# Patient Record
Sex: FEMALE | Race: WHITE
[De-identification: names, ages, dates, MRNs, and addresses within clinical notes are randomized per-mention and may not be internally consistent; named-entity substitution may affect disease eponyms.]

---

## 2017-05-16 ENCOUNTER — HOSPITAL ENCOUNTER (OUTPATIENT)
Dept: HOSPITAL 61 - CT | Age: 52
Discharge: HOME | End: 2017-05-16
Attending: FAMILY MEDICINE
Payer: COMMERCIAL

## 2017-05-16 DIAGNOSIS — Z12.31: Primary | ICD-10-CM

## 2017-05-16 DIAGNOSIS — E03.9: ICD-10-CM

## 2017-05-16 DIAGNOSIS — E78.00: ICD-10-CM

## 2017-05-16 DIAGNOSIS — R07.1: ICD-10-CM

## 2017-05-16 LAB
CHOLEST SERPL-MCNC: 211 MG/DL (ref 0–200)
CHOLEST/HDLC SERPL: 2.3 {RATIO}
HDLC SERPL-MCNC: 90 MG/DL (ref 40–60)
NONHDLC SERPL-MCNC: 121 MG/DL (ref 0–129)
TRIGL SERPL-MCNC: 82 MG/DL (ref 0–150)

## 2017-05-16 PROCEDURE — 80061 LIPID PANEL: CPT

## 2017-05-16 PROCEDURE — 71250 CT THORAX DX C-: CPT

## 2017-05-16 PROCEDURE — 84443 ASSAY THYROID STIM HORMONE: CPT

## 2017-05-16 PROCEDURE — 36415 COLL VENOUS BLD VENIPUNCTURE: CPT

## 2017-05-16 NOTE — RAD
Examination: CT chest with contrast



History: History of chest pain on breathing



Comparison: None available



Technique: Axial CT images of the abdomen pelvis were performed without

contrast. Coronal and sagittal reformats were performed





PQRS Compliance Statement:



One or more of the following individualized dose reduction techniques were

utilized for this examination:

1. Automated exposure control

2. Adjustment of the mA and/or kV according to patient size

3. Use of iterative reconstruction technique 



Findings:



The central airways are patent. The heart size grossly appears unremarkable.

The caliber of the aorta grossly appears unremarkable.



The examination is limited due to lack of IV contrast. No evidence of

pericardial effusion identified.



No radiologically significant mediastinal lymphadenopathy identified.



4 mm nodule or lymph node identified in the right middle lobe of the lung

along the fissure.



No evidence of pleural effusion or pneumothorax.



The visualized noncontrasted liver, spleen, adrenals grossly appears

unremarkable.



No evidence of lytic bony destructive lesion identified.





Impression:



1. 4 mm lymph node or solid nodule identified in the right middle lobe of the

lung along the fissure. Otherwise unremarkable exam. Follow-up per [employer]

Society guidelines.



Low risk patient: No follow-up needed. 

 

High-risk patient: Optional CT at 12 months.

## 2017-05-16 NOTE — RAD
DATE: 5/16/2017



EXAM: DIGITAL SCREEN BILAT W/CAD



HISTORY: Routine screening



COMPARISON: 5/30/2013



This study was interpreted with the benefit of Computerized Aided Detection

(CAD).







FINDINGS:



Breast Density:  HETERO  The breast parenchyma Is heterogeneous dense, which

could reduce sensitivity of mammography. Breast parenchyma level C.  There 2

small foci of asymmetry identified in the left outer breast, best seen on the

cc view.



      







IMPRESSION: 2 small foci of asymmetries identified in the left outer breast

best seen on the CC view. Recommend spot compression views of the left breast.

A ML view is recommended. If this persists ultrasound is recommended.







BI-RADS CATEGORY: 0 INCOMPLETE:  NEEDS ADDITIONAL IMAGING EVALUATION AND/OR

PRIOR MAMMOGRAMS FOR COMPARISON.



RECOMMENDED FOLLOW-UP: ADD ADDITIONAL IMAGING



PQRS compliance statement: Patient information was entered into a reminder

system with a target due date immediate recall for the next mammogram.



Mammography is a sensitive method for finding small breast cancers, but it

does not detect them all and is not a substitute for careful clinical

examination.  A negative mammogram does not negate a clinically suspicious

finding and should not result in delay in biopsying a clinically suspicious

abnormality.



"Our facility is accredited by the American [employer] Mammography

Program."

## 2017-05-22 ENCOUNTER — HOSPITAL ENCOUNTER (OUTPATIENT)
Dept: HOSPITAL 61 - NM | Age: 52
Discharge: HOME | End: 2017-05-22
Attending: INTERNAL MEDICINE
Payer: COMMERCIAL

## 2017-05-22 ENCOUNTER — HOSPITAL ENCOUNTER (OUTPATIENT)
Dept: HOSPITAL 61 - MAMMO | Age: 52
Discharge: HOME | End: 2017-05-22
Attending: FAMILY MEDICINE
Payer: COMMERCIAL

## 2017-05-22 DIAGNOSIS — J45.909: Primary | ICD-10-CM

## 2017-05-22 DIAGNOSIS — R06.02: ICD-10-CM

## 2017-05-22 DIAGNOSIS — R92.8: Primary | ICD-10-CM

## 2017-05-22 PROCEDURE — A9558 XE133 XENON 10MCI: HCPCS

## 2017-05-22 PROCEDURE — 71020: CPT

## 2017-05-22 PROCEDURE — 96374 THER/PROPH/DIAG INJ IV PUSH: CPT

## 2017-05-22 PROCEDURE — 78582 LUNG VENTILAT&PERFUS IMAGING: CPT

## 2017-05-22 PROCEDURE — A9540 TC99M MAA: HCPCS

## 2017-05-22 NOTE — RAD
Chest, 2 views, 5/22/2017:



History: Shortness of breath, asthma



Comparison is made to a study from 5/28/2015. The heart size and pulmonary

vascularity are normal. There is minimal scarring over the pulmonary apices.

No acute infiltrate is seen. There is no evidence of pleural fluid.



IMPRESSION: No acute cardiopulmonary abnormality is detected.

## 2017-05-22 NOTE — RAD
Ventilation/perfusion lung scan, 5/22/2017:



History: Dyspnea, asthma



The ventilation study was performed utilizing 27 mCi of xenon-133. Activity in

the lungs is symmetric. There is good washout of the xenon from both lungs.

Perfusion imaging was performed utilizing 6.0 mCi of technetium 99m MAA. A

similar pattern of activity is present in the lungs. No segmental or unmatched

perfusion defect is seen.





IMPRESSION: No significant abnormality is detected.

## 2017-05-24 NOTE — RAD
DATE: 5/22/2017



EXAM: DIGITAL DIAGNOSTIC LT



HISTORY: Possible abnormality on screening



COMPARISON: Screening examination 5/16/2017



This study was interpreted with the benefit of Computerized Aided Detection (CAD
).



FINDINGS:



Breast Density: Unchanged relative to the screening exam. 



Additional views including a coned compression cc view, rolled cc views and an 
ML view were obtained. On the additional views no abnormality is seen. No 
dominant mass or architectural distortion is apparent. A follow-up examination 
of the left breast is suggested in 6 months to further document stability 







IMPRESSION probable benign findings.







BI-RADS CATEGORY: 3 PROBABLE BENIGN FINDING(S-SHORT INTERVAL FOLLOW-UP SUGGESTED



RECOMMENDED FOLLOW-UP: 6M 6 MONTH FOLLOW-UP



PQRS compliance statement: Patient information was entered into a reminder 
system with a target due date 11/22/2017 for the next mammogram.



Mammography is a sensitive method for finding small breast cancers, but it does 
not detect them all and is not a substitute for careful clinical examination. A 
negative mammogram does not negate a clinically suspicious finding and should 
not result in delay in biopsying a clinically suspicious abnormality.



"Our facility is accredited by the American College of [employer] Mammography 
Program."
MTDD

## 2017-05-30 ENCOUNTER — HOSPITAL ENCOUNTER (OUTPATIENT)
Dept: HOSPITAL 61 - ECHO | Age: 52
Discharge: HOME | End: 2017-05-30
Attending: INTERNAL MEDICINE
Payer: COMMERCIAL

## 2017-05-30 DIAGNOSIS — I27.2: Primary | ICD-10-CM

## 2017-05-30 DIAGNOSIS — I08.1: ICD-10-CM

## 2017-05-30 DIAGNOSIS — I31.3: ICD-10-CM

## 2017-05-30 PROCEDURE — 93306 TTE W/DOPPLER COMPLETE: CPT

## 2017-06-01 NOTE — CARD
--------------- APPROVED REPORT --------------





EXAM: Two-dimensional and M-mode echocardiogram with Doppler and color Doppler.



Other Information 

Quality : Good



INDICATION

Pulmonary Hypertention 



2D DIMENSIONS 

RVDd2.1 (2.9-3.5cm)Left Atrium(2D)2.3 (1.6-4.0cm)

IVSd0.6 (0.7-1.1cm)Aortic Root(2D)2.3 (2.0-3.7cm)

LVDd4.5 (3.9-5.9cm)LVOT Diameter2.0 (1.8-2.4cm)

PWd0.8 (0.7-1.1cm)LVDs2.6 (2.5-4.0cm)

FS (%) 30.0 %SV70.1 ml

LVEF(%)60.0 (>50%)



Aortic Valve

AoV Peak Jomar.135.7cm/sAoV VTI29.1cm

AO Peak GR.7.4mmHgLVOT Peak Jomar.108.5cm/s

LVOT  VTI 23.99cmAO Mean GR.4mmHg

[place] (VMAX)2.16zd4LDZ   (VTI)2.50cm2



Mitral Valve

MV E Jjpmnsro874.2cm/sMV DECEL UMEU179gj

MV A Nbrvkhfk37.2cm/sMV BOR83tf

E/A  Ratio2.2MVA (PHT)3.31cm2



TDI

E/Lateral E'5.9E/Medial E'10.1



Tricuspid Valve

TR P. Ztdfztif004sx/sRAP NBEWUUAD2zbBl

TR Peak Gr.36iqUrDUAZ13weLr



Pulmonary Vein

S1 Dqiqehws00.3cm/sD2 Mjbduubd40.3cm/s

PVa nivjysej013xyec



 LEFT VENTRICLE 

The left ventricle is normal size. There is normal left ventricular wall thickness. The left ventricu
lar systolic function is normal and the ejection fraction is within normal range. The Ejection Fracti
on is 55-60%. There is normal LV segmental wall motion. Transmitral Doppler flow pattern is normal fo
r age.



 RIGHT VENTRICLE 

The right ventricle is normal size. The right ventricular systolic function is normal.



 ATRIA 

The left atrium size is normal. The right atrium size is normal. The interatrial septum is intact wit
h no evidence for an atrial septal defect or patent foramen ovale as noted on 2-D or Doppler imaging.




 AORTIC VALVE 

The aortic valve is normal in structure and function. Doppler and Color Flow revealed no significant 
aortic regurgitation. There is no significant aortic valvular stenosis.



 MITRAL VALVE 

The mitral valve is normal in structure and function. There is no evidence of mitral valve prolapse. 
There is no mitral valve stenosis. Doppler and Color-flow revealed trace mitral regurgitation.



 TRICUSPID VALVE 

The tricuspid valve is normal in structure and function. Doppler and Color Flow revealed trace to mil
d tricuspid regurgitation. The PA pressure was estimated at 27 mmHg. There is no tricuspid valve sten
osis.



 PULMONIC VALVE 

The pulmonary valve is normal in structure and function. Doppler and Color Flow revealed trace pulmon
ic valvular regurgitation. There is no pulmonic valvular stenosis.



 GREAT VESSELS 

The aortic root is normal in size. The ascending aorta is normal in size. The IVC is normal in size a
nd collapses >50% with inspiration.



 PERICARDIAL EFFUSION 

There is no evidence of significant pericardial effusion.



Critical Notification

Critical Value: No



<Conclusion>

The left ventricular systolic function is normal and the ejection fraction is within normal range. Th
e Ejection Fraction is 55-60%.

There is normal LV segmental wall motion.

Doppler and Color Flow revealed trace to mild tricuspid regurgitation. The PA pressure was estimated 
at 27 mmHg.

## 2017-12-06 ENCOUNTER — HOSPITAL ENCOUNTER (OUTPATIENT)
Dept: HOSPITAL 61 - KCIC | Age: 52
Discharge: HOME | End: 2017-12-06
Attending: FAMILY MEDICINE
Payer: COMMERCIAL

## 2017-12-06 DIAGNOSIS — J18.9: Primary | ICD-10-CM

## 2017-12-06 PROCEDURE — 71020: CPT

## 2017-12-06 NOTE — KCIC
Indication: Cough for 6 days.

 

Time of exam 11:42 AM

 

No prior studies are available for comparison.

 

There is patchy airspace infiltrate identified in the right midlung field,

suggestive of pneumonia. The left lung is clear. No effusion or 

pneumothorax is seen.

 

IMPRESSION: Patchy right-sided pneumonia. Follow-up after course of 

therapy to confirm clearing is recommended.

 

Electronically signed by: Yo Layne MD (12/6/2017 12:06 PM) [id number]

## 2018-01-25 ENCOUNTER — HOSPITAL ENCOUNTER (OUTPATIENT)
Dept: HOSPITAL 61 - KCIC MAMMO | Age: 53
Discharge: HOME | End: 2018-01-25
Attending: FAMILY MEDICINE
Payer: COMMERCIAL

## 2018-01-25 DIAGNOSIS — R92.8: Primary | ICD-10-CM

## 2018-01-25 DIAGNOSIS — J18.1: ICD-10-CM

## 2018-01-25 PROCEDURE — 76641 ULTRASOUND BREAST COMPLETE: CPT

## 2018-01-25 PROCEDURE — 71046 X-RAY EXAM CHEST 2 VIEWS: CPT

## 2018-01-25 PROCEDURE — 77065 DX MAMMO INCL CAD UNI: CPT

## 2019-02-13 ENCOUNTER — HOSPITAL ENCOUNTER (OUTPATIENT)
Dept: HOSPITAL 61 - KCIC MAMMO | Age: 54
Discharge: HOME | End: 2019-02-13
Attending: FAMILY MEDICINE
Payer: COMMERCIAL

## 2019-02-13 DIAGNOSIS — N64.89: ICD-10-CM

## 2019-02-13 DIAGNOSIS — N60.02: Primary | ICD-10-CM

## 2019-02-13 PROCEDURE — 77066 DX MAMMO INCL CAD BI: CPT

## 2019-02-13 PROCEDURE — 76641 ULTRASOUND BREAST COMPLETE: CPT

## 2019-02-13 NOTE — KCIC
Bilateral diagnostic digital mammograms with 3-D tomosynthesis:

 

Reason for examination: Follow-up nodule.

 

Comparison is made to previous studies dated back to 5/16/2017.

 

Bilateral mammograms in CC and oblique projections were obtained with 2-D 

imaging and 3-D tomosynthesis imaging on a Siemens Inspiration unit and 

reviewed on the workstation. Interpretation was made with the benefit of 

CAD.

 

The skin and nipples show no abnormalities. No abnormal axillary lymph 

nodes are seen. The breast parenchyma is heterogeneously dense. (Breast 

density: Category C.) There continues to be a small nodular density in the

3:00 position of the left breast. There is however some new nodularity 

present at approximately the 6:00 B position of the left breast and at the

5:30 C position of the right breast. Further evaluation with ultrasound 

will follow. There are no other dominant masses, suspicious calcifications

or architectural distortion.

 

Impression:

 

Small nodular density at the 3:00 position of the left breast with 

additional new nodular lesions at the 6:00 B position of the left breast 

and 5:30 C position of the left breast..

 

Your patient's mammogram demonstrates that she has dense breast tissue 

(breast density category C or D), which could hide abnormalities, and if 

she has other risk factors for breast cancer that have been identified, 

she might benefit from supplemental screening tests that may be suggested 

by you as her ordering physician. Dense breast tissue, in and of itself, 

is a relatively common condition. Therefore, this information is not 

provided to cause undue concern, but rather to raise your awareness and to

promote discussion with your patient regarding the presence of other risk 

factors, in addition to dense breast tissue. Your patient's mammography 

results will be sent to her.

 

BI-RAD Category 0: Incomplete. Needs additional imaging evaluation.

 

 

Bilateral breast ultrasound:

 

Comparison is made to previous study dated 1/25/2018.

 

Ultrasound examination was performed in the areas of mammographic concern 

and at the axilla.

 

In the right breast in the 5:30 position 5 cm from the nipple and 

corresponding to the area of mammographic concern, there is a 4.9 x 4.7 mm

lesion consistent with a partially septated cyst with a small calcific 

density in the wall. No abnormal vascularity seen. No other cystic or 

solid lesions are seen. No abnormal appearing lymph nodes are seen in the 

right axilla.

 

In the left breast, there continues to be a small 4.8 x 4 mm lesion 

consistent with a small cyst which has decreased in size. In the 6:00 

position 5 cm from the nipple, there is a small 3.1 mm hypoechoic 

circumscribed lesion consistent with some focal fibrocystic change. No 

other cystic or solid lesions are seen. No abnormal appearing lymph nodes 

are seen in the axilla.

 

IMPRESSION:

 

Benign-appearing cyst in the 5:30 position of the right breast. Decreasing

size of the cyst at the 3:00 position of the left breast. Additional 3.1 

mm hypoechoic fibrocystic type lesion at the 6:00 position of the left 

breast. Recommend reevaluation with ultrasound in 6 months.

 

BI-RADS Category 3:  Probably Benign.

 

"Our facility is accredited by the American College of [employer] 

Mammography Program."

 

This patient's information has been entered into a reminder system for the

patient to be notified with the results of her examination and a target 

date for the next mammogram.

 

Electronically signed by: Cami Carter MD (2/13/2019 10:45 AM) [facility]

## 2019-02-13 NOTE — KCIC
Bilateral diagnostic digital mammograms with 3-D tomosynthesis:

 

Reason for examination: Follow-up nodule.

 

Comparison is made to previous studies dated back to 5/16/2017.

 

Bilateral mammograms in CC and oblique projections were obtained with 2-D 

imaging and 3-D tomosynthesis imaging on a Siemens Inspiration unit and 

reviewed on the workstation. Interpretation was made with the benefit of 

CAD.

 

The skin and nipples show no abnormalities. No abnormal axillary lymph 

nodes are seen. The breast parenchyma is heterogeneously dense. (Breast 

density: Category C.) There continues to be a small nodular density in the

3:00 position of the left breast. There is however some new nodularity 

present at approximately the 6:00 B position of the left breast and at the

5:30 C position of the right breast. Further evaluation with ultrasound 

will follow. There are no other dominant masses, suspicious calcifications

or architectural distortion.

 

Impression:

 

Small nodular density at the 3:00 position of the left breast with 

additional new nodular lesions at the 6:00 B position of the left breast 

and 5:30 C position of the left breast..

 

Your patient's mammogram demonstrates that she has dense breast tissue 

(breast density category C or D), which could hide abnormalities, and if 

she has other risk factors for breast cancer that have been identified, 

she might benefit from supplemental screening tests that may be suggested 

by you as her ordering physician. Dense breast tissue, in and of itself, 

is a relatively common condition. Therefore, this information is not 

provided to cause undue concern, but rather to raise your awareness and to

promote discussion with your patient regarding the presence of other risk 

factors, in addition to dense breast tissue. Your patient's mammography 

results will be sent to her.

 

BI-RAD Category 0: Incomplete. Needs additional imaging evaluation.

 

 

Bilateral breast ultrasound:

 

Comparison is made to previous study dated 1/25/2018.

 

Ultrasound examination was performed in the areas of mammographic concern 

and at the axilla.

 

In the right breast in the 5:30 position 5 cm from the nipple and 

corresponding to the area of mammographic concern, there is a 4.9 x 4.7 mm

lesion consistent with a partially septated cyst with a small calcific 

density in the wall. No abnormal vascularity seen. No other cystic or 

solid lesions are seen. No abnormal appearing lymph nodes are seen in the 

right axilla.

 

In the left breast, there continues to be a small 4.8 x 4 mm lesion 

consistent with a small cyst which has decreased in size. In the 6:00 

position 5 cm from the nipple, there is a small 3.1 mm hypoechoic 

circumscribed lesion consistent with some focal fibrocystic change. No 

other cystic or solid lesions are seen. No abnormal appearing lymph nodes 

are seen in the axilla.

 

IMPRESSION:

 

Benign-appearing cyst in the 5:30 position of the right breast. Decreasing

size of the cyst at the 3:00 position of the left breast. Additional 3.1 

mm hypoechoic fibrocystic type lesion at the 6:00 position of the left 

breast. Recommend reevaluation with ultrasound in 6 months.

 

BI-RADS Category 3:  Probably Benign.

 

"Our facility is accredited by the American [employer] 

Mammography Program."

 

This patient's information has been entered into a reminder system for the

patient to be notified with the results of her examination and a target 

date for the next mammogram.

 

Electronically signed by: Cami Carter MD (2/13/2019 10:45 AM) [facility]

## 2020-07-29 ENCOUNTER — HOSPITAL ENCOUNTER (OUTPATIENT)
Dept: HOSPITAL 61 - MAMMO | Age: 55
Discharge: HOME | End: 2020-07-29
Attending: FAMILY MEDICINE
Payer: COMMERCIAL

## 2020-07-29 DIAGNOSIS — N60.42: ICD-10-CM

## 2020-07-29 DIAGNOSIS — E78.00: ICD-10-CM

## 2020-07-29 DIAGNOSIS — E03.9: ICD-10-CM

## 2020-07-29 DIAGNOSIS — N60.02: ICD-10-CM

## 2020-07-29 DIAGNOSIS — R92.8: Primary | ICD-10-CM

## 2020-07-29 LAB
ALBUMIN SERPL-MCNC: 3.5 G/DL (ref 3.4–5)
ALBUMIN/GLOB SERPL: 0.9 {RATIO} (ref 1–1.7)
ALP SERPL-CCNC: 68 U/L (ref 46–116)
ALT SERPL-CCNC: 22 U/L (ref 14–59)
ANION GAP SERPL CALC-SCNC: 5 MMOL/L (ref 6–14)
AST SERPL-CCNC: 22 U/L (ref 15–37)
BILIRUB SERPL-MCNC: 0.3 MG/DL (ref 0.2–1)
BUN SERPL-MCNC: 12 MG/DL (ref 7–20)
BUN/CREAT SERPL: 13 (ref 6–20)
CALCIUM SERPL-MCNC: 8.7 MG/DL (ref 8.5–10.1)
CHLORIDE SERPL-SCNC: 103 MMOL/L (ref 98–107)
CHOLEST SERPL-MCNC: 269 MG/DL (ref 0–200)
CHOLEST/HDLC SERPL: 3 {RATIO}
CO2 SERPL-SCNC: 31 MMOL/L (ref 21–32)
CREAT SERPL-MCNC: 0.9 MG/DL (ref 0.6–1)
ERYTHROCYTE [DISTWIDTH] IN BLOOD BY AUTOMATED COUNT: 13.1 % (ref 11.5–14.5)
GFR SERPLBLD BASED ON 1.73 SQ M-ARVRAT: 65.5 ML/MIN
GLOBULIN SER-MCNC: 4.1 G/DL (ref 2.2–3.8)
GLUCOSE SERPL-MCNC: 91 MG/DL (ref 70–99)
HCT VFR BLD CALC: 37.3 % (ref 36–47)
HDLC SERPL-MCNC: 90 MG/DL (ref 40–60)
HGB BLD-MCNC: 12.6 G/DL (ref 12–15.5)
LDLC: 141 MG/DL (ref 0–100)
MCH RBC QN AUTO: 31 PG (ref 25–35)
MCHC RBC AUTO-ENTMCNC: 34 G/DL (ref 31–37)
MCV RBC AUTO: 91 FL (ref 79–100)
PLATELET # BLD AUTO: 256 X10^3/UL (ref 140–400)
POTASSIUM SERPL-SCNC: 4.5 MMOL/L (ref 3.5–5.1)
PROT SERPL-MCNC: 7.6 G/DL (ref 6.4–8.2)
RBC # BLD AUTO: 4.09 X10^6/UL (ref 3.5–5.4)
SODIUM SERPL-SCNC: 139 MMOL/L (ref 136–145)
TRIGL SERPL-MCNC: 192 MG/DL (ref 0–150)
VLDLC: 38 MG/DL (ref 0–40)
WBC # BLD AUTO: 5.4 X10^3/UL (ref 4–11)

## 2020-07-29 PROCEDURE — 76641 ULTRASOUND BREAST COMPLETE: CPT

## 2020-07-29 PROCEDURE — 77066 DX MAMMO INCL CAD BI: CPT

## 2020-07-29 PROCEDURE — 84443 ASSAY THYROID STIM HORMONE: CPT

## 2020-07-29 PROCEDURE — 80061 LIPID PANEL: CPT

## 2020-07-29 PROCEDURE — 85027 COMPLETE CBC AUTOMATED: CPT

## 2020-07-29 PROCEDURE — 36415 COLL VENOUS BLD VENIPUNCTURE: CPT

## 2020-07-29 PROCEDURE — 84436 ASSAY OF TOTAL THYROXINE: CPT

## 2020-07-29 PROCEDURE — 80053 COMPREHEN METABOLIC PANEL: CPT

## 2020-07-29 NOTE — RAD
DATE: 7/29/2020 9:42 AM



EXAM: MAMMO JONES ORACIO OSORIO, BREAST LEFT



HISTORY:  Six-month follow-up probably benign left breast finding recommended

on previous left breast ultrasound. She is due for bilateral mammographic

screening.



COMPARISON: Bilateral mammograms of 12/13/2019, 5/16/2017 and left diagnostic

mammograms of 5/22/2017 and 1/25/2018. Limited left breast ultrasound of

1/25/2018



TECHNIQUE:



Bilateral CC and MLO views of the breasts were performed. Bilateral breast

tomosynthesis was performed in CC and MLO projections.

This study was interpreted using Computerized Aided Detection (CAD). Targeted

ultrasound of the left breast was also performed in the area of previous

imaging interest at the approximate 6:00 position.





FINDINGS:



Breast Density: HETERO  The breast parenchyma Is heterogeneously dense, which

could reduce sensitivity of mammography. Breast parenchyma level C





No suspicious masses, microcalcifications or architectural distortion is

present to suggest malignancy in either breast.



Targeted ultrasound of the left breast at the 6:00 position revealed

sonographically benign duct ectasia and a complicated cyst at the 3:00

position 3 cm from the nipple measuring 3 mm with low-level internal debris.

In light of her heterogeneously dense breasts that show a nodular parenchymal

pattern and multiple follow examinations over greater than 2 years, this is

considered benign and recommended for return to routine screening.   



IMPRESSION: No mammographic evidence of malignancy. 



BI-RADS CATEGORY: 2 BENIGN FINDING(S)



RECOMMENDED FOLLOW-UP: 12M 12 MONTH FOLLOW-UP Annual screening mammography is

recommended, unless clinically indicated sooner based on symptoms or change in

physical exam. Discussed with patient. I encouraged her to maintain self

breast awareness and promptly report any new symptoms or changing symptoms to

her referring provider.



PQRS compliance statement: Patient information was entered into a reminder

system with a target due date 7/30/2021 for the next mammogram.



Mammography is a sensitive method for finding small breast cancers, but it

does not detect them all and is not a substitute for careful clinical

examination.  A negative mammogram does not negate a clinically suspicious

finding and should not result in delay in biopsying a clinically suspicious

abnormality.



"Our facility is accredited by the American College [employer] Mammography

Program."

## 2021-09-23 ENCOUNTER — HOSPITAL ENCOUNTER (OUTPATIENT)
Dept: HOSPITAL 61 - KCIC MAMMO | Age: 56
End: 2021-09-23
Attending: FAMILY MEDICINE
Payer: COMMERCIAL

## 2021-09-23 DIAGNOSIS — N63.42: ICD-10-CM

## 2021-09-23 DIAGNOSIS — Z12.31: Primary | ICD-10-CM

## 2021-09-23 DIAGNOSIS — R92.1: ICD-10-CM

## 2021-09-23 PROCEDURE — 77067 SCR MAMMO BI INCL CAD: CPT

## 2021-09-23 PROCEDURE — 77063 BREAST TOMOSYNTHESIS BI: CPT

## 2021-09-23 NOTE — KCIC
Bilateral digital screening mammograms with 3-D tomosynthesis:



Reason for examination: Routine screening.



Comparison is made to previous studies dated back to 5/16/2017.



Bilateral mammograms in CC and oblique projections were obtained with 2-D imaging and 3-D tomosynthes
is imaging on a Siemens Inspiration unit and reviewed on the workstation. Interpretation was made wit
h the benefit of CAD.



The skin and nipples show no abnormalities. No abnormal axillary lymph nodes are seen. The breast par
enchyma is extremely dense. (Breast density: Category D.) There are no appear to be some clustered ca
lcifications in the right breast at approximately the 10:00 B position 6 cm posterior to the nipple. 
Recommend further evaluation with coned magnification views. In the left breast, there appear to be 2
 small nodular densities at the 12:00 position approximately 5.5 cm posterior to the nipple just abov
e the nipple line with irregular margination and measuring approximately 6 mm in greatest dimension a
nd at approximately the 3:00 position 5 cm from the nipple measuring 5 mm size. Further evaluation wi
 ultrasound is recommended. There are no other dominant masses, suspicious calcifications or tisha
ectural distortion.



Impression:



Calcifications in the 10:00 B position of the right breast. Recommend coned magnification views for f
urther evaluation.

Small nodular densities at the 12:00 position 5.5 cm from the nipple and at the 3:00 position 5 cm fr
om the nipple. Recommend further evaluation with ultrasound.



Your patient's mammogram demonstrates that she has dense breast tissue (breast density category C or 
D), which could hide abnormalities, and if she has other risk factors for breast cancer that have bee
n identified, she might benefit from supplemental screening tests that may be suggested by you as her
 ordering physician. Dense breast tissue, in and of itself, is a relatively common condition. Therefo
re, this information is not provided to cause undue concern, but rather to raise your awareness and t
o promote discussion with your patient regarding the presence of other risk factors, in addition to d
ense breast tissue. Your patient's mammography results will be sent to her.



BI-RAD Category 0: Incomplete. Needs additional imaging evaluation.



"Our facility is accredited by the American [employer] Mammography Program."



This patient's information has been entered into a reminder system for the patient to be notified wit
h the results of her examination and a target date for the next mammogram.



Electronically signed by: Cami Carter MD (9/23/2021 3:17 PM) [id number]

## 2021-09-29 ENCOUNTER — HOSPITAL ENCOUNTER (OUTPATIENT)
Dept: HOSPITAL 61 - KCIC MAMMO | Age: 56
End: 2021-09-29
Attending: FAMILY MEDICINE
Payer: COMMERCIAL

## 2021-09-29 DIAGNOSIS — N63.21: ICD-10-CM

## 2021-09-29 DIAGNOSIS — N63.22: Primary | ICD-10-CM

## 2021-09-29 PROCEDURE — 76641 ULTRASOUND BREAST COMPLETE: CPT

## 2021-09-29 PROCEDURE — 77065 DX MAMMO INCL CAD UNI: CPT

## 2021-09-29 NOTE — KCIC
Procedure: Diagnostic right mammogram. Targeted left breast ultrasound.



INDICATION: Screening mammogram shows calcifications in the 10:00 position of the right breast. Small
 nodular densities are seen left breast in the 12:00 position, approximately 5.5 cm posterior to nipp
le, and the 3 o'clock position, 5 cm from the nipple.





COMPARISON: Mammograms from the 9/23/2021, 7/29/2020, 2/13/2019, and 5/16/2017. Left breast ultrasoun
d from 1/25/2018 and 6/29/2020.





FINDINGS RIGHT MAMMOGRAM:

Additional views show multiple punctate calcifications in the right upper outer quadrant. These are f
airly diffuse with the more concentrated region. The area of calcifications approximately 5 cm in max
imum dimension with a more focal calcifications measuring approximately 1.7 cm. No associated mass or
 architectural distortion seen. The calcifications are homogeneous in size and density are in a branc
luis or linear distribution. Some were present on the 2020 and 2019 mammograms.



FINDINGS TARGETED LEFT BREAST ULTRASOUND: The areas of concern on mammogram were imaged, 12:00 and 3:
00 regions.



In the 3:00 position, 5 cm from the nipple, there is a 8 mm oval circumscribed hypoechoic mass with l
ow-level internal echoes. This is contiguous with a slightly prominent duct and is not changed since 
the July 2020 ultrasound.



In the 12:00 position, 3.5 cm from the nipple, there is an oval circumscribed hypoechoic mass which m
easures 6 mm in maximum dimension. These  both probably represent complicated cysts and correlate wit
h masses seen on mammogram.



IMPRESSION:

1. There probably benign calcifications in the 10:00 position of the right breast at posterior depth.
 The follow-up diagnostic right mammogram in 6 months is recommended.

2. There are 2 probably benign masses in the 12:00 position and 3:00 position left breast and are lik
ely due to complicated cysts. A follow-up targeted left breast ultrasound in 6 months is recommended.




ASSESSMENT: BI-RADS 3. Probably benign findings.



Recommendations: Diagnostic right mammogram and targeted left breast ultrasound in 6 months.



Results were given to the patient at time of examination. Patient information will be entered into [employer] reminder system with target recall date for the next mammogram and ultrasound.



Electronically signed by: Alona Johnson MD (9/29/2021 5:35 PM) [email or web address]

## 2021-09-29 NOTE — [facility]
Procedure: Diagnostic right mammogram. Targeted left breast ultrasound.



INDICATION: Screening mammogram shows calcifications in the 10:00 position of the right breast. Small
 nodular densities are seen left breast in the 12:00 position, approximately 5.5 cm posterior to nipp
le, and the 3 o'clock position, 5 cm from the nipple.





COMPARISON: Mammograms from the 9/23/2021, 7/29/2020, 2/13/2019, and 5/16/2017. Left breast ultrasoun
d from 1/25/2018 and 6/29/2020.





FINDINGS RIGHT MAMMOGRAM:

Additional views show multiple punctate calcifications in the right upper outer quadrant. These are f
airly diffuse with the more concentrated region. The area of calcifications approximately 5 cm in max
imum dimension with a more focal calcifications measuring approximately 1.7 cm. No associated mass or
 architectural distortion seen. The calcifications are homogeneous in size and density are in a branc
luis or linear distribution. Some were present on the 2020 and 2019 mammograms.



FINDINGS TARGETED LEFT BREAST ULTRASOUND: The areas of concern on mammogram were imaged, 12:00 and 3:
00 regions.



In the 3:00 position, 5 cm from the nipple, there is a 8 mm oval circumscribed hypoechoic mass with l
ow-level internal echoes. This is contiguous with a slightly prominent duct and is not changed since 
the July 2020 ultrasound.



In the 12:00 position, 3.5 cm from the nipple, there is an oval circumscribed hypoechoic mass which m
easures 6 mm in maximum dimension. These  both probably represent complicated cysts and correlate wit
h masses seen on mammogram.



IMPRESSION:

1. There probably benign calcifications in the 10:00 position of the right breast at posterior depth.
 The follow-up diagnostic right mammogram in 6 months is recommended.

2. There are 2 probably benign masses in the 12:00 position and 3:00 position left breast and are lik
ely due to complicated cysts. A follow-up targeted left breast ultrasound in 6 months is recommended.




ASSESSMENT: BI-RADS 3. Probably benign findings.



Recommendations: Diagnostic right mammogram and targeted left breast ultrasound in 6 months.



Results were given to the patient at time of examination. Patient information will be entered into "[employer] reminder system with target recall date for the next mammogram and ultrasound.



Electronically signed by: Alona Johnson MD (9/29/2021 5:35 PM) [id number]

## 2022-01-14 ENCOUNTER — HOSPITAL ENCOUNTER (OUTPATIENT)
Dept: HOSPITAL 61 - LAB | Age: 57
End: 2022-01-14
Attending: FAMILY MEDICINE
Payer: COMMERCIAL

## 2022-01-14 DIAGNOSIS — E78.00: ICD-10-CM

## 2022-01-14 DIAGNOSIS — E06.3: ICD-10-CM

## 2022-01-14 DIAGNOSIS — E03.9: Primary | ICD-10-CM

## 2022-01-14 DIAGNOSIS — D64.9: ICD-10-CM

## 2022-01-14 DIAGNOSIS — Z79.890: ICD-10-CM

## 2022-01-14 LAB
ALBUMIN SERPL-MCNC: 3.4 G/DL (ref 3.4–5)
ALBUMIN/GLOB SERPL: 0.9 {RATIO} (ref 1–1.7)
ALP SERPL-CCNC: 67 U/L (ref 46–116)
ALT SERPL-CCNC: 26 U/L (ref 14–59)
ANION GAP SERPL CALC-SCNC: 7 MMOL/L (ref 6–14)
AST SERPL-CCNC: 20 U/L (ref 15–37)
BASOPHILS # BLD AUTO: 0 X10^3/UL (ref 0–0.2)
BASOPHILS NFR BLD: 1 % (ref 0–3)
BILIRUB SERPL-MCNC: 0.2 MG/DL (ref 0.2–1)
BUN SERPL-MCNC: 17 MG/DL (ref 7–20)
BUN/CREAT SERPL: 21 (ref 6–20)
CALCIUM SERPL-MCNC: 8.1 MG/DL (ref 8.5–10.1)
CHLORIDE SERPL-SCNC: 105 MMOL/L (ref 98–107)
CHOLEST SERPL-MCNC: 264 MG/DL (ref 0–200)
CHOLEST/HDLC SERPL: 3 {RATIO}
CO2 SERPL-SCNC: 26 MMOL/L (ref 21–32)
CREAT SERPL-MCNC: 0.8 MG/DL (ref 0.6–1)
EOSINOPHIL NFR BLD: 0.1 X10^3/UL (ref 0–0.7)
EOSINOPHIL NFR BLD: 1 % (ref 0–3)
ERYTHROCYTE [DISTWIDTH] IN BLOOD BY AUTOMATED COUNT: 12.8 % (ref 11.5–14.5)
GFR SERPLBLD BASED ON 1.73 SQ M-ARVRAT: 74.7 ML/MIN
GLUCOSE SERPL-MCNC: 88 MG/DL (ref 70–99)
HCT VFR BLD CALC: 36.8 % (ref 36–47)
HDLC SERPL-MCNC: 87 MG/DL (ref 40–60)
HGB BLD-MCNC: 12 G/DL (ref 12–15.5)
LDLC: 156 MG/DL (ref 0–100)
LYMPHOCYTES # BLD: 1.7 X10^3/UL (ref 1–4.8)
LYMPHOCYTES NFR BLD AUTO: 30 % (ref 24–48)
MCH RBC QN AUTO: 29 PG (ref 25–35)
MCHC RBC AUTO-ENTMCNC: 33 G/DL (ref 31–37)
MCV RBC AUTO: 89 FL (ref 79–100)
MONO #: 0.3 X10^3/UL (ref 0–1.1)
MONOCYTES NFR BLD: 6 % (ref 0–9)
NEUT #: 3.7 X10^3/UL (ref 1.8–7.7)
NEUTROPHILS NFR BLD AUTO: 63 % (ref 31–73)
PLATELET # BLD AUTO: 252 X10^3/UL (ref 140–400)
POTASSIUM SERPL-SCNC: 4.2 MMOL/L (ref 3.5–5.1)
PROT SERPL-MCNC: 7.2 G/DL (ref 6.4–8.2)
RBC # BLD AUTO: 4.13 X10^6/UL (ref 3.5–5.4)
SODIUM SERPL-SCNC: 138 MMOL/L (ref 136–145)
T4 FREE SERPL-MCNC: 1.29 NG/DL (ref 0.76–1.46)
THYROID STIM HORMONE (TSH): 0.5 UIU/ML (ref 0.36–3.74)
TRIGL SERPL-MCNC: 103 MG/DL (ref 0–150)
VLDLC: 21 MG/DL (ref 0–40)
WBC # BLD AUTO: 5.9 X10^3/UL (ref 4–11)

## 2022-01-14 PROCEDURE — 84443 ASSAY THYROID STIM HORMONE: CPT

## 2022-01-14 PROCEDURE — 36415 COLL VENOUS BLD VENIPUNCTURE: CPT

## 2022-01-14 PROCEDURE — 85025 COMPLETE CBC W/AUTO DIFF WBC: CPT

## 2022-01-14 PROCEDURE — 82746 ASSAY OF FOLIC ACID SERUM: CPT

## 2022-01-14 PROCEDURE — 82306 VITAMIN D 25 HYDROXY: CPT

## 2022-01-14 PROCEDURE — 82728 ASSAY OF FERRITIN: CPT

## 2022-01-14 PROCEDURE — 83540 ASSAY OF IRON: CPT

## 2022-01-14 PROCEDURE — 82607 VITAMIN B-12: CPT

## 2022-01-14 PROCEDURE — 80061 LIPID PANEL: CPT

## 2022-01-14 PROCEDURE — 84439 ASSAY OF FREE THYROXINE: CPT

## 2022-01-14 PROCEDURE — 80053 COMPREHEN METABOLIC PANEL: CPT

## 2022-01-14 PROCEDURE — 83550 IRON BINDING TEST: CPT
